# Patient Record
(demographics unavailable — no encounter records)

---

## 2025-01-08 NOTE — HISTORY OF PRESENT ILLNESS
[FreeTextEntry1] : Patient here for follow-up status post hysteroscopic resection of polyps 12/17/2024. Patient had some staining and heavier bleeding up to 1 week post procedure no vaginal bleeding no pain since. No coitus

## 2025-01-08 NOTE — PHYSICAL EXAM
[Chaperone Present] : A chaperone was present in the examining room during all aspects of the physical examination [27166] : A chaperone was present during the pelvic exam. [FreeTextEntry2] : Mary Jo Alba  [TextEntry] : Abdomen soft nontender nondistended. Vulva vagina within normal limits. Cervix long closed posterior. Uterus 6 to 8-week anteverted mobile nontender. No adnexal masses